# Patient Record
Sex: FEMALE | Race: WHITE | NOT HISPANIC OR LATINO | Employment: OTHER | URBAN - METROPOLITAN AREA
[De-identification: names, ages, dates, MRNs, and addresses within clinical notes are randomized per-mention and may not be internally consistent; named-entity substitution may affect disease eponyms.]

---

## 2018-05-23 ENCOUNTER — APPOINTMENT (OUTPATIENT)
Dept: GENERAL RADIOLOGY | Facility: CLINIC | Age: 63
End: 2018-05-23
Attending: EMERGENCY MEDICINE
Payer: COMMERCIAL

## 2018-05-23 ENCOUNTER — HOSPITAL ENCOUNTER (EMERGENCY)
Facility: CLINIC | Age: 63
Discharge: HOME | End: 2018-05-23
Attending: EMERGENCY MEDICINE | Admitting: EMERGENCY MEDICINE
Payer: COMMERCIAL

## 2018-05-23 VITALS
TEMPERATURE: 98.2 F | WEIGHT: 150 LBS | HEIGHT: 68 IN | SYSTOLIC BLOOD PRESSURE: 146 MMHG | HEART RATE: 56 BPM | BODY MASS INDEX: 22.73 KG/M2 | RESPIRATION RATE: 16 BRPM | OXYGEN SATURATION: 100 % | DIASTOLIC BLOOD PRESSURE: 91 MMHG

## 2018-05-23 DIAGNOSIS — R07.9 CHEST PAIN, UNSPECIFIED TYPE: ICD-10-CM

## 2018-05-23 LAB
ALBUMIN SERPL-MCNC: 3.9 G/DL (ref 3.4–5)
ALP SERPL-CCNC: 75 U/L (ref 40–150)
ALT SERPL W P-5'-P-CCNC: 19 U/L (ref 0–50)
ANION GAP SERPL CALCULATED.3IONS-SCNC: 7 MMOL/L (ref 3–14)
AST SERPL W P-5'-P-CCNC: 18 U/L (ref 0–45)
BASOPHILS # BLD AUTO: 0 10E9/L (ref 0–0.2)
BASOPHILS NFR BLD AUTO: 0.7 %
BILIRUB SERPL-MCNC: 0.8 MG/DL (ref 0.2–1.3)
BUN SERPL-MCNC: 11 MG/DL (ref 7–30)
CALCIUM SERPL-MCNC: 9.2 MG/DL (ref 8.5–10.1)
CHLORIDE SERPL-SCNC: 109 MMOL/L (ref 94–109)
CO2 SERPL-SCNC: 26 MMOL/L (ref 20–32)
CREAT SERPL-MCNC: 1.04 MG/DL (ref 0.52–1.04)
D DIMER PPP FEU-MCNC: 0.3 UG/ML FEU (ref 0–0.5)
DIFFERENTIAL METHOD BLD: NORMAL
EOSINOPHIL # BLD AUTO: 0.1 10E9/L (ref 0–0.7)
EOSINOPHIL NFR BLD AUTO: 2.5 %
ERYTHROCYTE [DISTWIDTH] IN BLOOD BY AUTOMATED COUNT: 13.2 % (ref 10–15)
GFR SERPL CREATININE-BSD FRML MDRD: 54 ML/MIN/1.7M2
GLUCOSE SERPL-MCNC: 101 MG/DL (ref 70–99)
HCT VFR BLD AUTO: 40.9 % (ref 35–47)
HGB BLD-MCNC: 13.6 G/DL (ref 11.7–15.7)
IMM GRANULOCYTES # BLD: 0 10E9/L (ref 0–0.4)
IMM GRANULOCYTES NFR BLD: 0.2 %
INTERPRETATION ECG - MUSE: NORMAL
LYMPHOCYTES # BLD AUTO: 0.8 10E9/L (ref 0.8–5.3)
LYMPHOCYTES NFR BLD AUTO: 20.1 %
MCH RBC QN AUTO: 29.4 PG (ref 26.5–33)
MCHC RBC AUTO-ENTMCNC: 33.3 G/DL (ref 31.5–36.5)
MCV RBC AUTO: 88 FL (ref 78–100)
MONOCYTES # BLD AUTO: 0.5 10E9/L (ref 0–1.3)
MONOCYTES NFR BLD AUTO: 11.8 %
NEUTROPHILS # BLD AUTO: 2.6 10E9/L (ref 1.6–8.3)
NEUTROPHILS NFR BLD AUTO: 64.7 %
NRBC # BLD AUTO: 0 10*3/UL
NRBC BLD AUTO-RTO: 0 /100
PLATELET # BLD AUTO: 231 10E9/L (ref 150–450)
POTASSIUM SERPL-SCNC: 4.1 MMOL/L (ref 3.4–5.3)
PROT SERPL-MCNC: 7.3 G/DL (ref 6.8–8.8)
RBC # BLD AUTO: 4.63 10E12/L (ref 3.8–5.2)
SODIUM SERPL-SCNC: 142 MMOL/L (ref 133–144)
TROPONIN I SERPL-MCNC: <0.015 UG/L (ref 0–0.04)
WBC # BLD AUTO: 4.1 10E9/L (ref 4–11)

## 2018-05-23 PROCEDURE — 85379 FIBRIN DEGRADATION QUANT: CPT | Performed by: EMERGENCY MEDICINE

## 2018-05-23 PROCEDURE — 71046 X-RAY EXAM CHEST 2 VIEWS: CPT

## 2018-05-23 PROCEDURE — 80053 COMPREHEN METABOLIC PANEL: CPT | Performed by: EMERGENCY MEDICINE

## 2018-05-23 PROCEDURE — 93005 ELECTROCARDIOGRAM TRACING: CPT

## 2018-05-23 PROCEDURE — 84484 ASSAY OF TROPONIN QUANT: CPT | Performed by: EMERGENCY MEDICINE

## 2018-05-23 PROCEDURE — 99285 EMERGENCY DEPT VISIT HI MDM: CPT | Mod: 25

## 2018-05-23 PROCEDURE — 85025 COMPLETE CBC W/AUTO DIFF WBC: CPT | Performed by: EMERGENCY MEDICINE

## 2018-05-23 ASSESSMENT — ENCOUNTER SYMPTOMS: SHORTNESS OF BREATH: 1

## 2018-05-23 NOTE — ED AVS SNAPSHOT
Emergency Department    [address] [facility] [address]    Phone:  [phone]    Fax:  [phone]                                       Joan rBay   MRN: [id number]    Department:   Emergency Department   Date of Visit:  5/23/2018           Patient Information     Date Of Birth          1955        Your diagnoses for this visit were:     Chest pain, unspecified type        You were seen by Belia Brink MD.      Follow-up Information     Follow up with Ирина Grissom MD.    Specialty:  Family Practice    Why:  As needed    Contact information:    [facility]  [address] [facility] [address]  [facility] [id number] [phone]          Discharge Instructions       Discharge Instructions  Chest Pain    You have been seen today for chest pain or discomfort.  At this time, your provider has found no signs that your chest pain is due to a serious or life-threatening condition, (or you have declined more testing and/or admission to the hospital). However, sometimes there is a serious problem that does not show up right away. Your evaluation today may not be complete and you may need further testing and evaluation.     Generally, every Emergency Department visit should have a follow-up clinic visit with either a primary or a specialty clinic/provider. Please follow-up as instructed by your emergency provider today.  Return to the Emergency Department if:    Your chest pain changes, gets worse, starts to happen more often, or comes with less activity.    You are newly short of breath.    You get very weak or tired.    You pass out or faint.    You have any new symptoms, like fever, cough, numb legs, or you cough up blood.    You have anything else that worries you.    Until you follow-up with your regular provider, please do the following:    Take one aspirin daily unless you have an allergy or are told not to by your provider.    If a stress test appointment has been made,  go to the appointment.    If you have questions, contact your regular provider.    Follow-up with your regular provider/clinic as directed; this is very important.    If you were given a prescription for medicine here today, be sure to read all of the information (including the package insert) that comes with your prescription.  This will include important information about the medicine, its side effects, and any warnings that you need to know about.  The pharmacist who fills the prescription can provide more information and answer questions you may have about the medicine.  If you have questions or concerns that the pharmacist cannot address, please call or return to the Emergency Department.       Remember that you can always come back to the Emergency Department if you are not able to see your regular provider in the amount of time listed above, if you get any new symptoms, or if there is anything that worries you.    24 Hour Appointment Hotline       To make an appointment at any [facility], call [phone] ([phone]). If you don't have a family doctor or clinic, we will help you find one. [facility] clinics are conveniently located to serve the needs of you and your family.             Review of your medicines      Our records show that you are taking the medicines listed below. If these are incorrect, please call your family doctor or clinic.        Dose / Directions Last dose taken    BUPROPION HCL PO        Refills:  0        LEXAPRO PO        Refills:  0                Procedures and tests performed during your visit     CBC with platelets differential    Comprehensive metabolic panel    D dimer quantitative    EKG 12 lead    Troponin I    XR Chest 2 Views      Orders Needing Specimen Collection     None      Pending Results     No orders found from 5/21/2018 to 5/24/2018.            Pending Culture Results     No orders found from 5/21/2018 to 5/24/2018.            Pending Results Instructions      If you had any lab results that were not finalized at the time of your Discharge, you can call the ED Lab Result RN at [phone]. You will be contacted by this team for any positive Lab results or changes in treatment. The nurses are available 7 days a week from 10A to 6:30P.  You can leave a message 24 hours per day and they will return your call.        Test Results From Your Hospital Stay        5/23/2018 12:23 PM      Narrative     XR CHEST 2 VW 5/23/2018 11:43 AM    HISTORY: Chest pain.    COMPARISON: None.    FINDINGS: No airspace consolidation, pleural effusion or pneumothorax.  Normal heart size.        Impression     IMPRESSION: No acute cardiopulmonary abnormality.    ASHLEY KELLER MD         5/23/2018 11:45 AM      Component Results     Component Value Ref Range & Units Status    WBC 4.1 4.0 - 11.0 10e9/L Final    RBC Count 4.63 3.8 - 5.2 10e12/L Final    Hemoglobin 13.6 11.7 - 15.7 g/dL Final    Hematocrit 40.9 35.0 - 47.0 % Final    MCV 88 78 - 100 fl Final    MCH 29.4 26.5 - 33.0 pg Final    MCHC 33.3 31.5 - 36.5 g/dL Final    RDW 13.2 10.0 - 15.0 % Final    Platelet Count 231 150 - 450 10e9/L Final    Diff Method Automated Method  Final    % Neutrophils 64.7 % Final    % Lymphocytes 20.1 % Final    % Monocytes 11.8 % Final    % Eosinophils 2.5 % Final    % Basophils 0.7 % Final    % Immature Granulocytes 0.2 % Final    Nucleated RBCs 0 0 /100 Final    Absolute Neutrophil 2.6 1.6 - 8.3 10e9/L Final    Absolute Lymphocytes 0.8 0.8 - 5.3 10e9/L Final    Absolute Monocytes 0.5 0.0 - 1.3 10e9/L Final    Absolute Eosinophils 0.1 0.0 - 0.7 10e9/L Final    Absolute Basophils 0.0 0.0 - 0.2 10e9/L Final    Abs Immature Granulocytes 0.0 0 - 0.4 10e9/L Final    Absolute Nucleated RBC 0.0  Final         5/23/2018 12:09 PM      Component Results     Component Value Ref Range & Units Status    Sodium 142 133 - 144 mmol/L Final    Potassium 4.1 3.4 - 5.3 mmol/L Final    Chloride 109 94 - 109 mmol/L Final     Carbon Dioxide 26 20 - 32 mmol/L Final    Anion Gap 7 3 - 14 mmol/L Final    Glucose 101 (H) 70 - 99 mg/dL Final    Urea Nitrogen 11 7 - 30 mg/dL Final    Creatinine 1.04 0.52 - 1.04 mg/dL Final    GFR Estimate 54 (L) >60 mL/min/1.7m2 Final    Non  GFR Calc    GFR Estimate If Black 65 >60 mL/min/1.7m2 Final    African American GFR Calc    Calcium 9.2 8.5 - 10.1 mg/dL Final    Bilirubin Total 0.8 0.2 - 1.3 mg/dL Final    Albumin 3.9 3.4 - 5.0 g/dL Final    Protein Total 7.3 6.8 - 8.8 g/dL Final    Alkaline Phosphatase 75 40 - 150 U/L Final    ALT 19 0 - 50 U/L Final    AST 18 0 - 45 U/L Final         5/23/2018 12:09 PM      Component Results     Component Value Ref Range & Units Status    Troponin I ES <0.015 0.000 - 0.045 ug/L Final    The 99th percentile for upper reference range is 0.045 ug/L.  Troponin values   in the range of 0.045 - 0.120 ug/L may be associated with risks of adverse   clinical events.           5/23/2018 11:58 AM      Component Results     Component Value Ref Range & Units Status    D Dimer 0.3 0.0 - 0.50 ug/ml FEU Final    This D-dimer assay is intended for use in conjunction with a clinical pretest   probability assessment model to exclude pulmonary embolism (PE) and deep   venous thrombosis (DVT) in outpatients suspected of PE or DVT. The cut-off   value is 0.5 ug/mL FEU.                  Clinical Quality Measure: Blood Pressure Screening     Your blood pressure was checked while you were in the emergency department today. The last reading we obtained was  BP: 151/87 . Please read the guidelines below about what these numbers mean and what you should do about them.  If your systolic blood pressure (the top number) is less than 120 and your diastolic blood pressure (the bottom number) is less than 80, then your blood pressure is normal. There is nothing more that you need to do about it.  If your systolic blood pressure (the top number) is 120-139 or your diastolic blood  "pressure (the bottom number) is 80-89, your blood pressure may be higher than it should be. You should have your blood pressure rechecked within a year by a primary care provider.  If your systolic blood pressure (the top number) is 140 or greater or your diastolic blood pressure (the bottom number) is 90 or greater, you may have high blood pressure. High blood pressure is treatable, but if left untreated over time it can put you at risk for heart attack, stroke, or kidney failure. You should have your blood pressure rechecked by a primary care provider within the next 4 weeks.  If your provider in the emergency department today gave you specific instructions to follow-up with your doctor or provider even sooner than that, you should follow that instruction and not wait for up to 4 weeks for your follow-up visit.        Thank you for choosing [facility]       Thank you for choosing [facility] for your care. Our goal is always to provide you with excellent care. Hearing back from our patients is one way we can continue to improve our services. Please take a few minutes to complete the written survey that you may receive in the mail after you visit with us. Thank you!        [employer] Information     [employer] lets you send messages to your doctor, view your test results, renew your prescriptions, schedule appointments and more. To sign up, go to [email or web address] . Click on \"Log in\" on the left side of the screen, which will take you to the Welcome page. Then click on \"Sign up Now\" on the right side of the page.     You will be asked to enter the access code listed below, as well as some personal information. Please follow the directions to create your username and password.     Your access code is: [email or web address]  Expires: 2018 12:44 PM     Your access code will  in 90 days. If you need help or a new code, please call your [facility] clinic or [phone].        Care EveryWhere ID     This is your Care " EveryWhere ID. This could be used by other organizations to access your [facility] medical records  [id number]        Equal Access to Services     ARIANA CABALLERO : Mariah Tsang, drea fiore, narinder thakkar, esteban alva. So [facility] [phone].    ATENCIÓN: Si habla español, tiene a estes disposición servicios gratuitos de asistencia lingüística. Llame al [phone].    We comply with applicable federal civil rights laws and [place] laws. We do not discriminate on the basis of race, color, national origin, age, disability, sex, sexual orientation, or gender identity.            After Visit Summary       This is your record. Keep this with you and show to your community pharmacist(s) and doctor(s) at your next visit.

## 2018-05-23 NOTE — ED AVS SNAPSHOT
Emergency Department    [address] [place] [address]    Phone:  [phone]    Fax:  [phone]                                       Joan Bray   MRN: [id number]    Department:   Emergency Department   Date of Visit:  5/23/2018           After Visit Summary Signature Page     I have received my discharge instructions, and my questions have been answered. I have discussed any challenges I see with this plan with the nurse or doctor.    ..........................................................................................................................................  Patient/Patient Representative Signature      ..........................................................................................................................................  Patient Representative Print Name and Relationship to Patient    ..................................................               ................................................  Date                                            Time    ..........................................................................................................................................  Reviewed by Signature/Title    ...................................................              ..............................................  Date                                                            Time

## 2018-05-23 NOTE — ED PROVIDER NOTES
"  History     Chief Complaint:  Chest Pain     HPI   Joan Bray is a 62 year old female who presents to the emergency department today for evaluation of chest pain.  She reports awaking over the last 2 nights with a \"deep pain and ache\" in her chest.  She would note the discomfort and then went back to sleep.  She notes that she has felt a little more short of breath during the last few days, which she notes is feeling like she has to take a deep breath or yawn to get enough oxygen.  She has felt lightheaded at times.  She denies any history of heart disease or any cardiac risk factors.  In terms of DVT/PE risk factors, her daughter has had DVT and PE although she is an ultra runner and this is attributed to her international travel and dehydration.  She is unsure if she has had hematologic workup. About 6 weeks ago, the patient went to [place] but has not had significant travel since then.  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  She denies any current illness.  She does have a history of anxiety.  She denies any leg swelling.  She notes that she exercises regularly and has not had any chest pain with exercise.    Allergies:  Drug allergies reviewed. No pertinent drug allergies.     Medications:    BUPROPION HCL PO  Escitalopram Oxalate (LEXAPRO PO)    Past Medical History:    Anxiety    Past Surgical History:    Gyn surgery     Family History:    Family history reviewed. No pertinent family history.     Social History:  The patient was here alone. .  Smoking Status: Never Smoker  Alcohol Use: Negative   Drug Use: Negative   Marital Status:     Lives with her .  Works as a special ed. teacher.    Review of Systems   Respiratory: Positive for shortness of breath.    Cardiovascular: Positive for chest pain. Negative for leg swelling.   All other systems reviewed and are negative.    Physical Exam     Patient Vitals for the past 24 hrs:   " "BP Temp Temp src Pulse Resp SpO2 Height Weight   05/23/18 1135 - - - - - 100 % - -   05/23/18 1130 - - - - - 100 % - -   05/23/18 1125 (!) 146/91 - - - - - - -   05/23/18 1029 151/87 98.2  F (36.8  C) Oral 56 16 100 % 1.727 m (5' 8\") 68 kg (150 lb)      Physical Exam  Constitutional:  Oriented to person, place, and time.      Appears well-developed and well-nourished.   HENT:   Head:    Normocephalic and atraumatic.   Right Ear:   Tympanic membrane and external ear normal.   Left Ear:   Tympanic membrane and external ear normal.   Mouth/Throat:   Oropharynx is clear and moist and mucous membranes are normal.   Eyes:    Conjunctivae normal and EOM are normal.      Pupils are equal, round, and reactive to light.   Neck:    Normal range of motion. Neck supple.   Cardiovascular:  Normal rate, S1 normal, S2 normal and normal heart sounds.      No gallop. No murmur heard.  Pulmonary/Chest:  Effort normal and breath sounds normal.      No wheezes. No rhonchi. No rales.   Abdominal:   Soft. Normal appearance. No hepatosplenomegaly.      No tenderness. No rigidity, no rebound, no guarding.      No CVA tenderness.   Musculoskeletal:  Normal range of motion.   Neurological:   Alert and oriented to person, place, and time.      Normal strength and normal reflexes.      No cranial nerve deficit or sensory deficit.      GCS eye subscore is 4. GCS verbal subscore is 5.      GCS motor subscore is 6. Finger to nose intact bilaterally.    Emergency Department Course     ECG:  ECG taken at 1033, ECG read at 1037  Sinus bradycardia  Otherwise normal ECG  Rate 55 bpm. VT interval 124 ms. QRS duration 76 ms. QT/QTc 428/409 ms. P-R-T axes 32 32 41.    Imaging:  Radiology findings were communicated with the patient who voiced understanding of the findings.    XR Chest 2 Views  IMPRESSION: No acute cardiopulmonary abnormality.  Reading per radiology     Laboratory:  Laboratory findings were communicated with the patient who voiced " understanding of the findings.    CBC: WBC 4.1, HGB 13.6,   CMP: Glucose (Collected 1135) 101 (H), GFR Estimate 54 (L) o/w WNL (Creatinine 1.04)  Troponin I (Collected 1135) <0.015   D dimer (Collected 1135) 0.3     Emergency Department Course:    Nursing notes and vitals reviewed.    1150 I performed an exam of the patient as documented above.     IV was inserted and blood was drawn for laboratory testing, results above.      The patient was sent for a chest x-ray while in the emergency department, results above.       1233 I personally reviewed the lab, EKG, and imaging results with the patient. I discussed the treatment plan with the patient. She expressed understanding of this plan and consented to discharge. She will be discharged home with instructions for care and follow up. In addition, the patient will return to the emergency department if their symptoms persist, worsen, if new symptoms arise or if there is any concern.  All questions were answered.     Impression & Plan      Medical Decision Making:  Joan Bray is a 62 year old female with a history of anxiety who presents to the emergency department today for evaluation of chest pain.  She notes that she awakened over the last 2 nights with a feeling of a deep pain and ache in her chest.  Today she just feels clinically off but does not have chest pain.  She did go back to sleep so the chest pain did not last any significant amount of time.  She denies all cardiac risk factors.  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  Her daughter has had DVT/PE but is an ultra runner and travels internationally and apparently it has been felt to be due to that.  She is unsure if her daughter has had any hematologic workup.  She does note that she has a history of anxiety and has had some stress with the end of the school year.  Here her examination, laboratory work, chest x-ray, and EKG are all normal.  I doubt this  represents cardiac ischemia as she has no risk factors and has not had pain with exercise.  She does have risk factors for DVT/PE in light of her daughter's diagnosis although it sounds like it is due to her daughter's circumstances. The patient did have a trip to [place] although this was 6 weeks ago and she otherwise is PERC and Well's negative other than age.   I think she can be discharged home with instructions about reasons to return and she can follow-up with her primary as needed.    Diagnosis:    ICD-10-CM    1. Chest pain, unspecified type R07.9      Disposition:   The patient is discharged to home.     Scribe Disclosure:  I, Domitila Sims, am serving as a scribe at 11:28 AM on 5/23/2018 to document services personally performed by Belia Brink MD, based on my observations and the provider's statements to me.       EMERGENCY DEPARTMENT       Belia Brink MD  05/23/18 9811

## 2019-03-11 ENCOUNTER — HOSPITAL ENCOUNTER (OUTPATIENT)
Dept: MAMMOGRAPHY | Facility: CLINIC | Age: 64
Discharge: HOME | End: 2019-03-11
Attending: FAMILY MEDICINE | Admitting: FAMILY MEDICINE
Payer: COMMERCIAL

## 2019-03-11 DIAGNOSIS — Z12.31 VISIT FOR SCREENING MAMMOGRAM: ICD-10-CM

## 2019-03-11 PROCEDURE — 77067 SCR MAMMO BI INCL CAD: CPT

## 2021-01-19 ENCOUNTER — IMMUNIZATION (OUTPATIENT)
Dept: PEDIATRICS | Facility: CLINIC | Age: 66
End: 2021-01-19
Payer: COMMERCIAL

## 2021-01-19 PROCEDURE — 91300 PR COVID VAC PFIZER DIL RECON 30 MCG/0.3 ML IM: CPT

## 2021-01-19 PROCEDURE — 0001A PR COVID VAC PFIZER DIL RECON 30 MCG/0.3 ML IM: CPT

## 2021-01-24 ENCOUNTER — HEALTH MAINTENANCE LETTER (OUTPATIENT)
Age: 66
End: 2021-01-24

## 2021-05-15 ENCOUNTER — HEALTH MAINTENANCE LETTER (OUTPATIENT)
Age: 66
End: 2021-05-15

## 2021-09-04 ENCOUNTER — HEALTH MAINTENANCE LETTER (OUTPATIENT)
Age: 66
End: 2021-09-04

## 2021-11-15 ENCOUNTER — HOSPITAL ENCOUNTER (OUTPATIENT)
Dept: MAMMOGRAPHY | Facility: CLINIC | Age: 66
Discharge: HOME | End: 2021-11-15
Attending: FAMILY MEDICINE | Admitting: FAMILY MEDICINE
Payer: COMMERCIAL

## 2021-11-15 DIAGNOSIS — Z12.31 VISIT FOR SCREENING MAMMOGRAM: ICD-10-CM

## 2021-11-15 PROCEDURE — 77063 BREAST TOMOSYNTHESIS BI: CPT

## 2022-02-19 ENCOUNTER — HEALTH MAINTENANCE LETTER (OUTPATIENT)
Age: 67
End: 2022-02-19

## 2022-10-22 ENCOUNTER — HEALTH MAINTENANCE LETTER (OUTPATIENT)
Age: 67
End: 2022-10-22

## 2024-01-25 ENCOUNTER — HOSPITAL ENCOUNTER (OUTPATIENT)
Dept: MAMMOGRAPHY | Facility: CLINIC | Age: 69
Discharge: HOME | End: 2024-01-25
Attending: FAMILY MEDICINE | Admitting: FAMILY MEDICINE
Payer: COMMERCIAL

## 2024-01-25 DIAGNOSIS — Z12.31 VISIT FOR SCREENING MAMMOGRAM: ICD-10-CM

## 2024-01-25 PROCEDURE — 77063 BREAST TOMOSYNTHESIS BI: CPT

## 2024-06-02 ENCOUNTER — HEALTH MAINTENANCE LETTER (OUTPATIENT)
Age: 69
End: 2024-06-02

## 2024-06-03 ENCOUNTER — HOSPITAL ENCOUNTER (OUTPATIENT)
Dept: CARDIOLOGY | Facility: CLINIC | Age: 69
Discharge: HOME | End: 2024-06-03
Attending: FAMILY MEDICINE | Admitting: FAMILY MEDICINE
Payer: COMMERCIAL

## 2024-06-03 DIAGNOSIS — E78.00 HIGH CHOLESTEROL: ICD-10-CM

## 2024-06-03 PROCEDURE — 75571 CT HRT W/O DYE W/CA TEST: CPT | Mod: 26 | Performed by: INTERNAL MEDICINE

## 2024-06-03 PROCEDURE — 75571 CT HRT W/O DYE W/CA TEST: CPT

## 2025-01-29 ENCOUNTER — HOSPITAL ENCOUNTER (OUTPATIENT)
Dept: MAMMOGRAPHY | Facility: CLINIC | Age: 70
Discharge: HOME | End: 2025-01-29
Attending: FAMILY MEDICINE
Payer: COMMERCIAL

## 2025-01-29 DIAGNOSIS — Z12.31 VISIT FOR SCREENING MAMMOGRAM: ICD-10-CM

## 2025-01-29 PROCEDURE — 77063 BREAST TOMOSYNTHESIS BI: CPT

## 2025-06-15 ENCOUNTER — HEALTH MAINTENANCE LETTER (OUTPATIENT)
Age: 70
End: 2025-06-15